# Patient Record
Sex: FEMALE | Race: OTHER | NOT HISPANIC OR LATINO | URBAN - METROPOLITAN AREA
[De-identification: names, ages, dates, MRNs, and addresses within clinical notes are randomized per-mention and may not be internally consistent; named-entity substitution may affect disease eponyms.]

---

## 2024-09-04 ENCOUNTER — OUTPATIENT (OUTPATIENT)
Dept: OUTPATIENT SERVICES | Facility: HOSPITAL | Age: 34
LOS: 1 days | End: 2024-09-04
Payer: MEDICAID

## 2024-09-04 VITALS
OXYGEN SATURATION: 98 % | RESPIRATION RATE: 16 BRPM | HEART RATE: 86 BPM | DIASTOLIC BLOOD PRESSURE: 70 MMHG | SYSTOLIC BLOOD PRESSURE: 109 MMHG | TEMPERATURE: 99 F

## 2024-09-04 DIAGNOSIS — O26.899 OTHER SPECIFIED PREGNANCY RELATED CONDITIONS, UNSPECIFIED TRIMESTER: ICD-10-CM

## 2024-09-04 PROCEDURE — 99212 OFFICE O/P EST SF 10 MIN: CPT | Mod: 25

## 2024-09-04 PROCEDURE — 59025 FETAL NON-STRESS TEST: CPT

## 2024-09-04 PROCEDURE — 76818 FETAL BIOPHYS PROFILE W/NST: CPT | Mod: 26

## 2024-09-04 PROCEDURE — 59025 FETAL NON-STRESS TEST: CPT | Mod: 26

## 2024-09-04 PROCEDURE — 76818 FETAL BIOPHYS PROFILE W/NST: CPT

## 2024-09-04 PROCEDURE — G0463: CPT

## 2024-09-04 NOTE — OB RN TRIAGE NOTE - INTERNATIONAL TRAVEL
Received call from Ramana: pt is out of her Lovenox and waiting for PA to go through on 0.4 ml (40 mg) sub-q injections. Pt currently has an active PA on prior 0.6 ml (60 mg) injections and willing to pay co-pay if we can send a few days worth Rx to pharmacy until her current PA is approved. Rx sent for #5 days (inject 0.35 mLs every 12 hours).  
No

## 2024-09-04 NOTE — OB PROVIDER TRIAGE NOTE - HISTORY OF PRESENT ILLNESS
34 yo  @36w5d  week presents to triage c/o contractions at 4am that has then subsided.  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

## 2024-09-04 NOTE — OB PROVIDER TRIAGE NOTE - ADDITIONAL INSTRUCTIONS
- d/c home  - labor precautions given  - patient to return to L+D if any vaginal bleeding, leakage of fluids or contraction pain  - encouraged patient to continue valtrex for prophylaxis  - f/u with Dr Hayes in office as scheduled

## 2024-09-04 NOTE — OB PROVIDER TRIAGE NOTE - NSOBPROVIDERNOTE_OBGYN_ALL_OB_FT
a/p siup @ 36weeks 5days not in labor, nst reactive bpp 8/8  - d/c home  - labor precautions given  - patient to return to L+D if any vaginal bleeding, leakage of fluids or contraction pain  - encouraged patient to continue valtrex for prophylaxis  - f/u with Dr Hayes in office as scheduled  nst reviewed by sarah Hernandez attending

## 2024-09-04 NOTE — OB PROVIDER TRIAGE NOTE - NSHPLABSRESULTS_GEN_ALL_CORE
< from: US Fetal Bio Profile w/Non-Stress (09.04.24 @ 14:12) >    GESTATIONAL AGE: 36weeks.    COMPARISON: None available.    TECHNIQUE:  Transabdominal pelvic sonogram only.    FINDINGS:    Single live Intrauterine gestation is present.  Fetal position: CEPH presentation.  Placenta location: FUND.  Amniotic fluid volume: ERNIE 17.8 cm.  Cervical length: Not visualized by transabdominal technique.  Fetal motion is seen in real-time and the fetal heart rate measures 150   bpm bpm.    Fetal anatomy and umbilical cord were not evaluated.    No fetal measurements were obtained    The biophysical profile is 8/8.    Body Movement: 2  (score 2) Greater or equal than 3 body/limb movements  (score 0) Less than 3 body/limb movements    Tone: 2  (score 2) One or more episodes extension/flexion  (score 0) No episodes extension/flexion    Breathing movements: 2  (score 2) Any breathing movements or hiccups  (score 0) No breathing movements    Amniotic fluid: 2  (score 2) One pocket >= 2cm in 2 perpendicular planes  (score 0) <2 X 2 cm pocket    Additional comments: None.    IMPRESSION:  Single live intrauterine pregnancy with a biophysical profile score of   8/8.    < end of copied text >

## 2024-09-04 NOTE — OB RN TRIAGE NOTE - FALL HARM RISK - UNIVERSAL INTERVENTIONS
Bed in lowest position, wheels locked, appropriate side rails in place/Call bell, personal items and telephone in reach/Instruct patient to call for assistance before getting out of bed or chair/Non-slip footwear when patient is out of bed/[place] to call system/Physically safe environment - no spills, clutter or unnecessary equipment/Purposeful Proactive Rounding/Room/bathroom lighting operational, light cord in reach

## 2024-09-04 NOTE — OB PROVIDER TRIAGE NOTE - NSHPPHYSICALEXAM_GEN_ALL_CORE
gen: nad, well appearing  Vital Signs Last 24 Hrs  T(C): 37 (04 Sep 2024 12:11), Max: 37 (04 Sep 2024 12:11)  T(F): 98.6 (04 Sep 2024 12:11), Max: 98.6 (04 Sep 2024 12:11)  HR: 86 (04 Sep 2024 12:11) (86 - 86)  BP: 109/70 (04 Sep 2024 12:11) (109/70 - 109/70)  BP(mean): --  RR: 16 (04 Sep 2024 12:11) (16 - 16)  SpO2: 98% (04 Sep 2024 12:11) (98% - 98%)    Parameters below as of 04 Sep 2024 12:11  Patient On (Oxygen Delivery Method): room air    abd: gravid, soft, non tender  gyn: left bartholin gland enlarged  sve closed and long

## 2024-09-06 DIAGNOSIS — Z3A.36 36 WEEKS GESTATION OF PREGNANCY: ICD-10-CM

## 2024-09-06 DIAGNOSIS — O99.013 ANEMIA COMPLICATING PREGNANCY, THIRD TRIMESTER: ICD-10-CM

## 2024-09-06 DIAGNOSIS — D64.9 ANEMIA, UNSPECIFIED: ICD-10-CM

## 2024-09-06 DIAGNOSIS — O47.03 FALSE LABOR BEFORE 37 COMPLETED WEEKS OF GESTATION, THIRD TRIMESTER: ICD-10-CM

## 2024-09-24 ENCOUNTER — OUTPATIENT (OUTPATIENT)
Dept: OUTPATIENT SERVICES | Facility: HOSPITAL | Age: 34
LOS: 1 days | End: 2024-09-24
Payer: MEDICAID

## 2024-09-24 VITALS
SYSTOLIC BLOOD PRESSURE: 111 MMHG | OXYGEN SATURATION: 97 % | RESPIRATION RATE: 17 BRPM | DIASTOLIC BLOOD PRESSURE: 75 MMHG | TEMPERATURE: 98 F | HEART RATE: 98 BPM

## 2024-09-24 DIAGNOSIS — O26.899 OTHER SPECIFIED PREGNANCY RELATED CONDITIONS, UNSPECIFIED TRIMESTER: ICD-10-CM

## 2024-09-24 PROCEDURE — 96360 HYDRATION IV INFUSION INIT: CPT

## 2024-09-24 PROCEDURE — 59025 FETAL NON-STRESS TEST: CPT | Mod: 26

## 2024-09-24 PROCEDURE — 76819 FETAL BIOPHYS PROFIL W/O NST: CPT

## 2024-09-24 PROCEDURE — 76819 FETAL BIOPHYS PROFIL W/O NST: CPT | Mod: 26

## 2024-09-24 PROCEDURE — 59025 FETAL NON-STRESS TEST: CPT

## 2024-09-24 PROCEDURE — G0463: CPT

## 2024-09-24 PROCEDURE — 99212 OFFICE O/P EST SF 10 MIN: CPT | Mod: 25

## 2024-09-24 RX ORDER — SODIUM CHLORIDE IRRIG SOLUTION 0.9 %
1000 SOLUTION, IRRIGATION IRRIGATION ONCE
Refills: 0 | Status: COMPLETED | OUTPATIENT
Start: 2024-09-24 | End: 2024-09-24

## 2024-09-24 RX ADMIN — Medication 1000 MILLILITER(S): at 14:15

## 2024-09-24 NOTE — OB PROVIDER TRIAGE NOTE - NSOBPROVIDERNOTE_OBGYN_ALL_OB_FT
pregnant at     - for prolong nst    - iv hydration     - bpp per dr huerta pregnant at     - for prolong nst    - iv hydration     - bpp per dr huerta    f/u note: bpp 8/8 ebonie: 15    NST reactive no decels    toco + irritabilities    dw dr blank- dc home today and see him In the office 9/26/24     labor precautions continue prenatal vitamins   if water breaks contractions noted vaginal bleeding noted return to delivery room  daily check the baby movements with fetal kick count

## 2024-09-24 NOTE — OB PROVIDER TRIAGE NOTE - HISTORY OF PRESENT ILLNESS
sent in by dr huerta for prolong nst due to noted non reactive fetal tracing in the office      sent in by dr huerta for prolong nst due to noted non reactive fetal tracing in the office     33yo  siup at 39 4/7weeeks sent in for above    denies s/sx of labor denies labor pains/ vag bleeding/ LOF    reports +FM     last seen by mfm  in [facility]

## 2024-09-24 NOTE — OB PROVIDER TRIAGE NOTE - YOU WERE IN THE HOSPITAL FOR:
f/u note: bpp 8/8 ebonie: 15    NST reactive no decels    toco + irritabilities    dw dr blank- jasmin home today and see him In the office 9/26/24     labor precautions continue prenatal vitamins   if water breaks contractions noted vaginal bleeding noted return to delivery room  daily check the baby movements with fetal kick count

## 2024-09-24 NOTE — OB PROVIDER TRIAGE NOTE - NSHPPHYSICALEXAM_GEN_ALL_CORE
moderate variabiltiy no decels +accels reactive     toco no ctxs    abd gravid soft NT no guarding no rebound     ve deferred at this time     extrem non pitting edema b/l

## 2024-09-24 NOTE — OB PROVIDER TRIAGE NOTE - NS_EDDCALCULATED_OBGYN_ALL_OB
Patient checked 15 minutes after receiving injection of Celestone. No reactions noted, no complaints voiced.  
LMP

## 2024-09-25 PROBLEM — Z98.890 OTHER SPECIFIED POSTPROCEDURAL STATES: Chronic | Status: ACTIVE | Noted: 2024-09-04

## 2024-09-25 PROBLEM — B00.9 HERPESVIRAL INFECTION, UNSPECIFIED: Chronic | Status: ACTIVE | Noted: 2024-09-04

## 2024-09-26 DIAGNOSIS — O36.8330 MATERNAL CARE FOR ABNORMALITIES OF THE FETAL HEART RATE OR RHYTHM, THIRD TRIMESTER, NOT APPLICABLE OR UNSPECIFIED: ICD-10-CM

## 2024-09-26 DIAGNOSIS — Z3A.39 39 WEEKS GESTATION OF PREGNANCY: ICD-10-CM

## 2024-09-26 DIAGNOSIS — O09.293 SUPERVISION OF PREGNANCY WITH OTHER POOR REPRODUCTIVE OR OBSTETRIC HISTORY, THIRD TRIMESTER: ICD-10-CM

## 2024-09-27 ENCOUNTER — INPATIENT (INPATIENT)
Facility: HOSPITAL | Age: 34
LOS: 2 days | Discharge: HOME | DRG: 951 | End: 2024-09-30
Attending: OBSTETRICS & GYNECOLOGY | Admitting: OBSTETRICS & GYNECOLOGY
Payer: MEDICAID

## 2024-09-27 ENCOUNTER — OUTPATIENT (OUTPATIENT)
Dept: OUTPATIENT SERVICES | Facility: HOSPITAL | Age: 34
LOS: 1 days | End: 2024-09-27

## 2024-09-27 VITALS
TEMPERATURE: 98 F | SYSTOLIC BLOOD PRESSURE: 120 MMHG | HEIGHT: 62 IN | DIASTOLIC BLOOD PRESSURE: 65 MMHG | WEIGHT: 167.99 LBS | RESPIRATION RATE: 16 BRPM | HEART RATE: 78 BPM | OXYGEN SATURATION: 99 %

## 2024-09-27 VITALS
OXYGEN SATURATION: 98 % | HEIGHT: 62 IN | SYSTOLIC BLOOD PRESSURE: 126 MMHG | HEART RATE: 73 BPM | TEMPERATURE: 98 F | RESPIRATION RATE: 15 BRPM | WEIGHT: 167.99 LBS | DIASTOLIC BLOOD PRESSURE: 65 MMHG

## 2024-09-27 DIAGNOSIS — O26.899 OTHER SPECIFIED PREGNANCY RELATED CONDITIONS, UNSPECIFIED TRIMESTER: ICD-10-CM

## 2024-09-27 DIAGNOSIS — Z34.90 ENCOUNTER FOR SUPERVISION OF NORMAL PREGNANCY, UNSPECIFIED, UNSPECIFIED TRIMESTER: ICD-10-CM

## 2024-09-27 LAB
ANISOCYTOSIS BLD QL: SLIGHT — SIGNIFICANT CHANGE UP
APTT BLD: 28.8 SEC — SIGNIFICANT CHANGE UP (ref 24.5–35.6)
BASO STIPL BLD QL SMEAR: PRESENT — SIGNIFICANT CHANGE UP
BASOPHILS # BLD AUTO: 0.01 K/UL — SIGNIFICANT CHANGE UP (ref 0–0.2)
BASOPHILS NFR BLD AUTO: 0.1 % — SIGNIFICANT CHANGE UP (ref 0–2)
BLD GP AB SCN SERPL QL: SIGNIFICANT CHANGE UP
DACRYOCYTES BLD QL SMEAR: SLIGHT — SIGNIFICANT CHANGE UP
EOSINOPHIL # BLD AUTO: 0.06 K/UL — SIGNIFICANT CHANGE UP (ref 0–0.5)
EOSINOPHIL NFR BLD AUTO: 0.8 % — SIGNIFICANT CHANGE UP (ref 0–6)
FIBRINOGEN PPP-MCNC: 480 MG/DL — HIGH (ref 200–475)
HCT VFR BLD CALC: 30 % — LOW (ref 34.5–45)
HGB BLD-MCNC: 9.5 G/DL — LOW (ref 11.5–15.5)
IMM GRANULOCYTES NFR BLD AUTO: 0.6 % — SIGNIFICANT CHANGE UP (ref 0–0.9)
INR BLD: 0.91 RATIO — SIGNIFICANT CHANGE UP (ref 0.85–1.16)
LG PLATELETS BLD QL AUTO: SLIGHT — SIGNIFICANT CHANGE UP
LYMPHOCYTES # BLD AUTO: 1.92 K/UL — SIGNIFICANT CHANGE UP (ref 1–3.3)
LYMPHOCYTES # BLD AUTO: 24.3 % — SIGNIFICANT CHANGE UP (ref 13–44)
MANUAL SMEAR VERIFICATION: SIGNIFICANT CHANGE UP
MCHC RBC-ENTMCNC: 22.5 PG — LOW (ref 27–34)
MCHC RBC-ENTMCNC: 31.7 GM/DL — LOW (ref 32–36)
MCV RBC AUTO: 70.9 FL — LOW (ref 80–100)
MICROCYTES BLD QL: SLIGHT — SIGNIFICANT CHANGE UP
MONOCYTES # BLD AUTO: 0.45 K/UL — SIGNIFICANT CHANGE UP (ref 0–0.9)
MONOCYTES NFR BLD AUTO: 5.7 % — SIGNIFICANT CHANGE UP (ref 2–14)
NEUTROPHILS # BLD AUTO: 5.4 K/UL — SIGNIFICANT CHANGE UP (ref 1.8–7.4)
NEUTROPHILS NFR BLD AUTO: 68.5 % — SIGNIFICANT CHANGE UP (ref 43–77)
NRBC # BLD: 0 /100 WBCS — SIGNIFICANT CHANGE UP (ref 0–0)
PLAT MORPH BLD: NORMAL — SIGNIFICANT CHANGE UP
PLATELET # BLD AUTO: 157 K/UL — SIGNIFICANT CHANGE UP (ref 150–400)
PLATELET COUNT - ESTIMATE: NORMAL — SIGNIFICANT CHANGE UP
POLYCHROMASIA BLD QL SMEAR: SLIGHT — SIGNIFICANT CHANGE UP
PROTHROM AB SERPL-ACNC: 10.6 SEC — SIGNIFICANT CHANGE UP (ref 9.9–13.4)
RBC # BLD: 4.23 M/UL — SIGNIFICANT CHANGE UP (ref 3.8–5.2)
RBC # FLD: 14.2 % — SIGNIFICANT CHANGE UP (ref 10.3–14.5)
RBC BLD AUTO: ABNORMAL
WBC # BLD: 7.89 K/UL — SIGNIFICANT CHANGE UP (ref 3.8–10.5)
WBC # FLD AUTO: 7.89 K/UL — SIGNIFICANT CHANGE UP (ref 3.8–10.5)

## 2024-09-27 RX ORDER — CHLORHEXIDINE GLUCONATE ORAL RINSE 1.2 MG/ML
1 SOLUTION DENTAL DAILY
Refills: 0 | Status: DISCONTINUED | OUTPATIENT
Start: 2024-09-27 | End: 2024-09-28

## 2024-09-27 RX ORDER — OXYTOCIN/RINGER'S LACTATE 20/500ML
167 PLASTIC BAG, INJECTION (ML) INTRAVENOUS
Qty: 30 | Refills: 0 | Status: DISCONTINUED | OUTPATIENT
Start: 2024-09-27 | End: 2024-09-28

## 2024-09-27 RX ORDER — SODIUM CHLORIDE IRRIG SOLUTION 0.9 %
1000 SOLUTION, IRRIGATION IRRIGATION
Refills: 0 | Status: DISCONTINUED | OUTPATIENT
Start: 2024-09-27 | End: 2024-09-28

## 2024-09-27 RX ORDER — INFLUENZA VIRUS VACCINE 15; 15; 15; 15 UG/.5ML; UG/.5ML; UG/.5ML; UG/.5ML
0.5 SUSPENSION INTRAMUSCULAR ONCE
Refills: 0 | Status: ACTIVE | OUTPATIENT
Start: 2024-09-27 | End: 2025-08-26

## 2024-09-27 RX ORDER — VALACYCLOVIR 1000 MG/1
1000 TABLET ORAL DAILY
Refills: 0 | Status: DISCONTINUED | OUTPATIENT
Start: 2024-09-27 | End: 2024-09-30

## 2024-09-27 RX ORDER — SODIUM CITRATE AND CITRIC ACID MONOHYDRATE 334; 500 MG/5ML; MG/5ML
15 SOLUTION ORAL EVERY 6 HOURS
Refills: 0 | Status: DISCONTINUED | OUTPATIENT
Start: 2024-09-27 | End: 2024-09-28

## 2024-09-27 RX ADMIN — Medication 125 MILLILITER(S): at 11:51

## 2024-09-27 RX ADMIN — VALACYCLOVIR 1000 MILLIGRAM(S): 1000 TABLET ORAL at 20:32

## 2024-09-27 RX ADMIN — Medication 125 MILLILITER(S): at 16:01

## 2024-09-27 NOTE — OB PROVIDER H&P - HISTORY OF PRESENT ILLNESS
OB PA H&P     34 year old  40wks RIZWAN 24 LMP 23 presents for scheduled elective induction at term. Notes irreg ctx, denies VB, LOF. Reports FM  Prenatal care: Dr. Hayes, HSV2 on Valtrex 1000 mg daily, denies recent outbreak, denies symptoms of HSV at this time  Obhx: TOP with pills   Gynhx: denies fibroids, ovarian cysts,   OB PA H&P     34 year old  40wks RIZWAN 24 LMP 23 presents for scheduled elective induction at term. Notes irreg ctx, denies VB, LOF. Reports FM  Prenatal care: Dr. 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 reports negative workup.  Medications: valtrex 1000 mg, pnv, vit d3  Surgical hx: denies  Social hx: denies anxiety/depression,  OB PA H&P     34 year old  40wks RIZWAN 24 LMP 23 presents for scheduled elective induction at term. Notes irreg ctx, denies VB, LOF. Reports FM  Prenatal care: Dr. 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 reports negative workup including CT.  Medications: valtrex 1000 mg, pnv, vit d3  Surgical hx: denies  Social hx: denies anxiety/depression, reports vaping nicotine few times a day, vaped yesterday, reports a few glasses of alcohol (6-7 glasses of wine) the whole pregnancy, denies drug usage  NKDA   OB PA H&P     34 year old  40wks RIZWAN 24 LMP 23 presents for scheduled elective induction at term. Notes irreg ctx, denies VB, LOF. Reports FM  Prenatal care: Dr. 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 reports negative workup including CT.  Medications: valtrex 1000 mg, pnv, vit d3  Surgical hx: denies  Social hx: denies anxiety/depression, reports vaping nicotine few times a day, vaped yesterday, reports a few glasses of alcohol (6-7 glasses of wine) the whole pregnancy, denies drug usage  NKDA   OB PA H&P     34 year old  40wks RIZWAN 24 LMP 23 presents for scheduled elective induction at term. Notes irreg ctx, denies VB, LOF. Reports FM  Prenatal care: Dr. 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 reports negative workup including CT.  Medications: valtrex 1000 mg, pnv, vit d3  Surgical hx: denies  Social hx: denies anxiety/depression, reports vaping nicotine few times a day, vaped yesterday, reports a few glasses of alcohol (6-7 glasses of wine) the whole pregnancy, denies drug usage  NKDA

## 2024-09-27 NOTE — OB PROVIDER LABOR PROGRESS NOTE - NS_SUBJECTIVE/OBJECTIVE_OBGYN_ALL_OB_FT
Patient evaluated at bedside   Resting comfortably   No new complaints     SVE: 1/50/-3/vertex/intact   cervical ripening balloon placed @ 2054  patient tolerated exam well

## 2024-09-27 NOTE — OB PROVIDER H&P - NSICDXPASTMEDICALHX_GEN_ALL_CORE_FT
PAST MEDICAL HISTORY:  HSV-2 infection     One previous induced termination of pregnancy     Visual snow syndrome

## 2024-09-27 NOTE — OB PROVIDER LABOR PROGRESS NOTE - ASSESSMENT
MIOL at term 40 weeks , stable  continue PO cytotec protocol  meal to meal  ambulate  venodynes while in bed  maternal/fetal monitoring  dw Dr. Abigail faust reviewed by Dr. Herminia smith attending

## 2024-09-27 NOTE — OB PROVIDER LABOR PROGRESS NOTE - NS_SUBJECTIVE/OBJECTIVE_OBGYN_ALL_OB_FT
Patient evaluated at bedside with Dr. Hayes   Patient resting comfortably   No complaints at this time   Discussed cervical ripening balloon, patient is agreeable to placement     SVE: 1/50/-3/vertex/intact   patient tolerated exam well

## 2024-09-27 NOTE — OB PROVIDER LABOR PROGRESS NOTE - ASSESSMENT
33 yo MIOL at term 40 weeks , stable    continue close maternal and fetal monitoring   continue PO cytotec protocol  pain management per patient request   place cervical ripening balloon   dw Dr. Abigail gaspart reviewed by Dr. Herminia pond

## 2024-09-27 NOTE — OB PROVIDER LABOR PROGRESS NOTE - NS_SUBJECTIVE/OBJECTIVE_OBGYN_ALL_OB_FT
s/p PO cytotec #2 20mcg  Patient offers no complaints at this time  nst cat I  toco irritability     continue current mngt  dw Dr. Hayes  nst reviewed by Dr. Jonathon smith attending

## 2024-09-27 NOTE — OB RN PATIENT PROFILE - MATERNAL MARITAL STATUS, OB PROFILE
Steroid Joint Injection   AMBULATORY CARE:   What you need to know about steroid joint injection:  A steroid joint injection is a procedure to inject steroid medicine into a joint  Steroid medicine decreases pain and inflammation  The injection may also contain an anesthetic (numbing medicine) to decrease pain  It may be done to treat conditions such as arthritis, gout, or carpal tunnel syndrome  The injections may be given in your knee, ankle, shoulder, elbow, or wrist  Injections may also be given in your hip, toe, thumb, or finger  How to prepare for steroid joint injection:  Your healthcare provider will talk to you about how to prepare for this procedure  He will tell you what medicines to take or not take on the day of your procedure  You may need to stop taking blood thinners several days before your procedure  What will happen during steroid joint injection:  You may be given local anesthesia to numb the area where the injection will be given  With local anesthesia, you may still feel pressure during the procedure, but you should not feel any pain  Your healthcare provider may use ultrasound or fluoroscopy (a type of x-ray) to guide the needle to the right area  He will then inject the steroid into your joint  A bandage will be placed on the injection site  What will happen after steroid joint injection:  You may have redness, warmth, or sweating in your face and chest right after the steroid injection  Steroids can affect blood sugar levels  If you have diabetes, you should check your blood sugars closely in the first 24 hours after your procedure  Risks of steroid joint injection:  You may get an infection in your joint  The injection may also cause more pain during the first 24 to 36 hours  You may need more than one injection to feel pain relief  The skin near the injection site may be damaged and become discolored or indented  This can happen if the steroid is placed too close to your skin   A tendon near the injection site may rupture or a nerve can be damaged  Contact your healthcare provider if:   · You have fever or chills  · You have redness or swelling in the injection site  · You have more pain than usual in your joint for more than 72 hours  · You have questions or concerns about your condition or care  Medicines:   · Pain medicine  may be given  Ask how to take this medicine safely  · Take your medicine as directed  Contact your healthcare provider if you think your medicine is not helping or if you have side effects  Tell him or her if you are allergic to any medicine  Keep a list of the medicines, vitamins, and herbs you take  Include the amounts, and when and why you take them  Bring the list or the pill bottles to follow-up visits  Carry your medicine list with you in case of an emergency  Self-care:   · Leave the bandage on for 8 to 12 hours  Care for your wound as directed  · Rest the area  as directed  You may need to decrease weight on certain joints, such as the knee, for a period of time  Ask when you can return to your daily activities  · Elevate  your limb where the steroid injection was given  Elevate the limb above the level of your heart as often as you can  This will help decrease swelling and pain  Prop your limb on pillows or blankets to keep it elevated comfortably  · Apply ice  on your joint for 15 to 20 minutes every hour or as directed  Use an ice pack, or put crushed ice in a plastic bag  Cover it with a towel  Ice helps prevent tissue damage and decreases swelling and pain  Follow up with your doctor as directed:  Write down your questions so you remember to ask them during your visits  © Copyright [employer] 2022 Information is for End User's use only and may not be sold, redistributed or otherwise used for commercial purposes   All illustrations and images included in CareNotes® are the copyrighted property of [employer]  or [address] [place] [address] [employer]   The above information is an  only  It is not intended as medical advice for individual conditions or treatments  Talk to your doctor, nurse or pharmacist before following any medical regimen to see if it is safe and effective for you  single

## 2024-09-27 NOTE — OB RN PATIENT PROFILE - POST PARTUM DEPRESSION SCREEN OB 2
no

## 2024-09-27 NOTE — OB PROVIDER LABOR PROGRESS NOTE - ASSESSMENT
35 yo MIOL at term 40 weeks , stable  cervical balloon in place   cytotec held due to frequent contractions    continue close maternal and fetal monitoring   continue PO cytotec protocol once contractions allow   pain management per patient request   dione faust reviewed by Dr. Herminia smith attending

## 2024-09-27 NOTE — OB RN PATIENT PROFILE - CAREGIVER ADDRESS
no fever/no burning urination/no vomiting/no hematuria/no chills/no blood in stool/no dysuria/no abdominal distension/no diarrhea
[facility] [id number]

## 2024-09-27 NOTE — OB PROVIDER H&P - PROBLEM SELECTOR PLAN 1
-Admit to labor and delivery  -Admission labs  -Valtrex ordered  -pain mngmt per patient request  -induction with PO cytotec per protocol  -venodynes  -continuos maternal/fetal monitoring  -informed consent to be obtained by physician  -D/w Dr. Hayes  -Dr. Holt, [place] attending, reviewed NST

## 2024-09-28 LAB
ABO RH CONFIRMATION: SIGNIFICANT CHANGE UP
ALBUMIN SERPL ELPH-MCNC: 2.6 G/DL — LOW (ref 3.5–5)
ALP SERPL-CCNC: 179 U/L — HIGH (ref 40–120)
ALT FLD-CCNC: 26 U/L DA — SIGNIFICANT CHANGE UP (ref 10–60)
ANION GAP SERPL CALC-SCNC: 7 MMOL/L — SIGNIFICANT CHANGE UP (ref 5–17)
APPEARANCE UR: CLEAR — SIGNIFICANT CHANGE UP
AST SERPL-CCNC: 23 U/L — SIGNIFICANT CHANGE UP (ref 10–40)
BACTERIA # UR AUTO: ABNORMAL /HPF
BASOPHILS # BLD AUTO: 0.02 K/UL — SIGNIFICANT CHANGE UP (ref 0–0.2)
BASOPHILS NFR BLD AUTO: 0.2 % — SIGNIFICANT CHANGE UP (ref 0–2)
BILIRUB SERPL-MCNC: 0.5 MG/DL — SIGNIFICANT CHANGE UP (ref 0.2–1.2)
BILIRUB UR-MCNC: NEGATIVE — SIGNIFICANT CHANGE UP
BUN SERPL-MCNC: 10 MG/DL — SIGNIFICANT CHANGE UP (ref 7–18)
CALCIUM SERPL-MCNC: 9.2 MG/DL — SIGNIFICANT CHANGE UP (ref 8.4–10.5)
CHLORIDE SERPL-SCNC: 109 MMOL/L — HIGH (ref 96–108)
CO2 SERPL-SCNC: 22 MMOL/L — SIGNIFICANT CHANGE UP (ref 22–31)
COLOR SPEC: YELLOW — SIGNIFICANT CHANGE UP
CREAT ?TM UR-MCNC: 46 MG/DL — SIGNIFICANT CHANGE UP
CREAT SERPL-MCNC: 0.46 MG/DL — LOW (ref 0.5–1.3)
DIFF PNL FLD: ABNORMAL
EGFR: 130 ML/MIN/1.73M2 — SIGNIFICANT CHANGE UP
EOSINOPHIL # BLD AUTO: 0.04 K/UL — SIGNIFICANT CHANGE UP (ref 0–0.5)
EOSINOPHIL NFR BLD AUTO: 0.3 % — SIGNIFICANT CHANGE UP (ref 0–6)
EPI CELLS # UR: PRESENT
GLUCOSE SERPL-MCNC: 85 MG/DL — SIGNIFICANT CHANGE UP (ref 70–99)
GLUCOSE UR QL: NEGATIVE MG/DL — SIGNIFICANT CHANGE UP
HCT VFR BLD CALC: 30.1 % — LOW (ref 34.5–45)
HGB BLD-MCNC: 9.9 G/DL — LOW (ref 11.5–15.5)
IMM GRANULOCYTES NFR BLD AUTO: 0.5 % — SIGNIFICANT CHANGE UP (ref 0–0.9)
KETONES UR-MCNC: 40 MG/DL
LDH SERPL L TO P-CCNC: 170 U/L — SIGNIFICANT CHANGE UP (ref 120–225)
LEUKOCYTE ESTERASE UR-ACNC: ABNORMAL
LYMPHOCYTES # BLD AUTO: 1.55 K/UL — SIGNIFICANT CHANGE UP (ref 1–3.3)
LYMPHOCYTES # BLD AUTO: 12.7 % — LOW (ref 13–44)
MCHC RBC-ENTMCNC: 23 PG — LOW (ref 27–34)
MCHC RBC-ENTMCNC: 32.9 GM/DL — SIGNIFICANT CHANGE UP (ref 32–36)
MCV RBC AUTO: 70 FL — LOW (ref 80–100)
MONOCYTES # BLD AUTO: 0.53 K/UL — SIGNIFICANT CHANGE UP (ref 0–0.9)
MONOCYTES NFR BLD AUTO: 4.4 % — SIGNIFICANT CHANGE UP (ref 2–14)
NEUTROPHILS # BLD AUTO: 9.97 K/UL — HIGH (ref 1.8–7.4)
NEUTROPHILS NFR BLD AUTO: 81.9 % — HIGH (ref 43–77)
NITRITE UR-MCNC: NEGATIVE — SIGNIFICANT CHANGE UP
NRBC # BLD: 0 /100 WBCS — SIGNIFICANT CHANGE UP (ref 0–0)
PH UR: 6 — SIGNIFICANT CHANGE UP (ref 5–8)
PLATELET # BLD AUTO: 151 K/UL — SIGNIFICANT CHANGE UP (ref 150–400)
POTASSIUM SERPL-MCNC: 3.8 MMOL/L — SIGNIFICANT CHANGE UP (ref 3.5–5.3)
POTASSIUM SERPL-SCNC: 3.8 MMOL/L — SIGNIFICANT CHANGE UP (ref 3.5–5.3)
PROT ?TM UR-MCNC: 27 MG/DL — HIGH (ref 0–12)
PROT SERPL-MCNC: 6.4 G/DL — SIGNIFICANT CHANGE UP (ref 6–8.3)
PROT UR-MCNC: NEGATIVE MG/DL — SIGNIFICANT CHANGE UP
PROT/CREAT UR-RTO: 0.6 RATIO — HIGH (ref 0–0.2)
RBC # BLD: 4.3 M/UL — SIGNIFICANT CHANGE UP (ref 3.8–5.2)
RBC # FLD: 14.3 % — SIGNIFICANT CHANGE UP (ref 10.3–14.5)
RBC CASTS # UR COMP ASSIST: ABNORMAL /HPF
RUBV IGG SER-ACNC: 1.21 INDEX — SIGNIFICANT CHANGE UP
RUBV IGG SER-IMP: POSITIVE — SIGNIFICANT CHANGE UP
SODIUM SERPL-SCNC: 138 MMOL/L — SIGNIFICANT CHANGE UP (ref 135–145)
SP GR SPEC: 1.01 — SIGNIFICANT CHANGE UP (ref 1–1.03)
T PALLIDUM AB TITR SER: NEGATIVE — SIGNIFICANT CHANGE UP
URATE SERPL-MCNC: 4.6 MG/DL — SIGNIFICANT CHANGE UP (ref 2.5–7)
UROBILINOGEN FLD QL: 1 MG/DL — SIGNIFICANT CHANGE UP (ref 0.2–1)
WBC # BLD: 12.17 K/UL — HIGH (ref 3.8–10.5)
WBC # FLD AUTO: 12.17 K/UL — HIGH (ref 3.8–10.5)
WBC UR QL: 3 /HPF — SIGNIFICANT CHANGE UP (ref 0–5)

## 2024-09-28 RX ORDER — KETOROLAC TROMETHAMINE 10 MG/1
30 TABLET, FILM COATED ORAL ONCE
Refills: 0 | Status: DISCONTINUED | OUTPATIENT
Start: 2024-09-28 | End: 2024-09-30

## 2024-09-28 RX ORDER — OXYTOCIN/RINGER'S LACTATE 20/500ML
PLASTIC BAG, INJECTION (ML) INTRAVENOUS
Qty: 30 | Refills: 0 | Status: DISCONTINUED | OUTPATIENT
Start: 2024-09-28 | End: 2024-09-28

## 2024-09-28 RX ORDER — MAGNESIUM HYDROXIDE 400 MG/5ML
30 SUSPENSION, ORAL (FINAL DOSE FORM) ORAL
Refills: 0 | Status: DISCONTINUED | OUTPATIENT
Start: 2024-09-28 | End: 2024-09-30

## 2024-09-28 RX ORDER — ANTI-ITCH CREAM 1 G/100G
1 OINTMENT TOPICAL EVERY 6 HOURS
Refills: 0 | Status: DISCONTINUED | OUTPATIENT
Start: 2024-09-28 | End: 2024-09-30

## 2024-09-28 RX ORDER — SODIUM CHLORIDE 0.9 % (FLUSH) 0.9 %
3 SYRINGE (ML) INJECTION EVERY 8 HOURS
Refills: 0 | Status: DISCONTINUED | OUTPATIENT
Start: 2024-09-28 | End: 2024-09-30

## 2024-09-28 RX ORDER — OXYTOCIN/RINGER'S LACTATE 20/500ML
167 PLASTIC BAG, INJECTION (ML) INTRAVENOUS
Qty: 30 | Refills: 0 | Status: DISCONTINUED | OUTPATIENT
Start: 2024-09-28 | End: 2024-09-30

## 2024-09-28 RX ORDER — ACETAMINOPHEN 325 MG
975 TABLET ORAL EVERY 6 HOURS
Refills: 0 | Status: DISCONTINUED | OUTPATIENT
Start: 2024-09-28 | End: 2024-09-30

## 2024-09-28 RX ORDER — TETANUS TOXOID, REDUCED DIPHTHERIA TOXOID AND ACELLULAR PERTUSSIS VACCINE, ADSORBED 5; 2.5; 8; 8; 2.5 [IU]/.5ML; [IU]/.5ML; UG/.5ML; UG/.5ML; UG/.5ML
0.5 SUSPENSION INTRAMUSCULAR ONCE
Refills: 0 | Status: DISCONTINUED | OUTPATIENT
Start: 2024-09-28 | End: 2024-09-30

## 2024-09-28 RX ORDER — SODIUM CHLORIDE 0.9 % (FLUSH) 0.9 %
1000 SYRINGE (ML) INJECTION
Refills: 0 | Status: DISCONTINUED | OUTPATIENT
Start: 2024-09-28 | End: 2024-09-30

## 2024-09-28 RX ORDER — SOAP/LANOLIN
1 BAR TOPICAL EVERY 4 HOURS
Refills: 0 | Status: DISCONTINUED | OUTPATIENT
Start: 2024-09-28 | End: 2024-09-30

## 2024-09-28 RX ORDER — DIBUCAINE 1 %
1 OINTMENT (GRAM) TOPICAL EVERY 6 HOURS
Refills: 0 | Status: DISCONTINUED | OUTPATIENT
Start: 2024-09-28 | End: 2024-09-30

## 2024-09-28 RX ORDER — DIPHENHYDRAMINE HCL 12.5MG/5ML
25 LIQUID (ML) ORAL EVERY 6 HOURS
Refills: 0 | Status: DISCONTINUED | OUTPATIENT
Start: 2024-09-28 | End: 2024-09-30

## 2024-09-28 RX ORDER — PRENATAL VIT,CAL 76/IRON/FOLIC 29 MG-1 MG
1 TABLET ORAL DAILY
Refills: 0 | Status: DISCONTINUED | OUTPATIENT
Start: 2024-09-28 | End: 2024-09-30

## 2024-09-28 RX ORDER — PRAMOXINE HYDROCHLORIDE 10 MG/ML
1 LOTION TOPICAL EVERY 4 HOURS
Refills: 0 | Status: DISCONTINUED | OUTPATIENT
Start: 2024-09-28 | End: 2024-09-30

## 2024-09-28 RX ADMIN — Medication 975 MILLIGRAM(S): at 22:35

## 2024-09-28 RX ADMIN — Medication 975 MILLIGRAM(S): at 16:50

## 2024-09-28 RX ADMIN — Medication 3 MILLILITER(S): at 16:42

## 2024-09-28 RX ADMIN — Medication 2 MILLIUNIT(S)/MIN: at 05:13

## 2024-09-28 RX ADMIN — Medication 3 MILLILITER(S): at 21:45

## 2024-09-28 RX ADMIN — VALACYCLOVIR 1000 MILLIGRAM(S): 1000 TABLET ORAL at 20:47

## 2024-09-28 RX ADMIN — Medication 975 MILLIGRAM(S): at 16:07

## 2024-09-28 RX ADMIN — Medication 1 TABLET(S): at 16:50

## 2024-09-28 RX ADMIN — Medication 6 MILLIUNIT(S)/MIN: at 05:35

## 2024-09-28 RX ADMIN — Medication 6 MILLIUNIT(S)/MIN: at 09:05

## 2024-09-28 RX ADMIN — Medication 975 MILLIGRAM(S): at 21:38

## 2024-09-28 RX ADMIN — Medication 125 MILLILITER(S): at 08:00

## 2024-09-28 NOTE — OB PROVIDER LABOR PROGRESS NOTE - ASSESSMENT
Pitocin stopped  IV bolus started  FSE placed for FHR contact during position changes  FHR resolved to baseline    Will allow for  resuscitation and then consider restarting pitocin  Dr Cueva aware

## 2024-09-28 NOTE — OB PROVIDER DELIVERY SUMMARY - NSPROVIDERDELIVERYNOTE_OBGYN_ALL_OB_FT
OF A VIABLE BABY GIRL AT 09:02 FROM VTX PRESENTATION EMERSON POSITION,APGARS 9/9.PLACENTA AT 09:05 BY CCT.ONE SMALL PERIURETHRAL TEAR REPAIRED WITH ONE 2-0 CHROMIC STITCH.CERVIX:INTACT.UTERUS:FIRM.EBL:150CC

## 2024-09-28 NOTE — OB RN DELIVERY SUMMARY - NS_ADMITROM_OBGYN_ALL_OB
Lab Results   Component Value Date    HGBA1C 5 8 (H) 12/30/2022       Recent Labs     04/29/23  0816 04/29/23  1356 04/29/23  1950 04/30/23  0617   POCGLU 240* 149* 170* 211*       Blood Sugar Average: Last 72 hrs:  (P) 182 [id number]     · Continue tube feeds  · Sliding scale insulin No

## 2024-09-28 NOTE — OB PROVIDER LABOR PROGRESS NOTE - NS_OBIHIFHRDETAILS_OBGYN_ALL_OB_FT
140, mod lexie, accels present
150, mod lexie, accels present
FHT: baseline: 135, moderate variability, + accels, no decels
FHT: baseline: 135, moderate variability, + accels, no decels

## 2024-09-28 NOTE — OB PROVIDER LABOR PROGRESS NOTE - NS_SUBJECTIVE/OBJECTIVE_OBGYN_ALL_OB_FT
Pt comfortable s/p epidural replacement Pt comfortable s/p epidural replacement  ICU Vital Signs Last 24 Hrs  T(C): 36.9 (27 Sep 2024 14:44), Max: 36.9 (27 Sep 2024 14:44)  T(F): 98.4 (27 Sep 2024 14:44), Max: 98.4 (27 Sep 2024 14:44)  HR: 73 (27 Sep 2024 14:44) (73 - 78)  BP: 126/65 (27 Sep 2024 14:44) (120/65 - 126/65)  BP(mean): --  ABP: --  ABP(mean): --  RR: 15 (27 Sep 2024 14:44) (15 - 16)  SpO2: 98% (27 Sep 2024 14:44) (98% - 99%)    O2 Parameters below as of 27 Sep 2024 14:44  Patient On (Oxygen Delivery Method): room air

## 2024-09-28 NOTE — CHART NOTE - NSCHARTNOTEFT_GEN_A_CORE
ATTENDING NOTE    PT EXAMINED.BALOON WAS REMOVED.  CX:4/80%/VTX/-3  AROM CLEAR FLUID IUPC PLACED.  GOOD BXB VARIABILITY  CATEGORY I TRACING  UTERINE CONTRACTIONS Q 5 MIN  A/P IUP AT 40 WEEKS S/P CYTOTEC INDUCTION  CONTINUE MATERNAL/FETAL SURVEILLANCE
cx:6/90%/vtx/-1  fhr:130 reactive  ucx's q 2-3 min   pitocin 2mu/min  A/P IUP at 40weeks in labor;continue maternal/fetal surveillance

## 2024-09-28 NOTE — OB PROVIDER DELIVERY SUMMARY - NSSELHIDDEN_OBGYN_ALL_OB_FT
Patient contacted and informed about all lab results, an also advised on maintaining healthy lifestyle, medication compliance, and regular checkup.  Patient re-iterates detailed understanding.    Lindy Staley MD   [NS_DeliveryAttending1_OBGYN_ALL_OB_FT:[id number]==]

## 2024-09-28 NOTE — OB PROVIDER LABOR PROGRESS NOTE - ASSESSMENT
35 yo  at 40.1 undergoing elective MIOL  -cervical balloon in place since 190  -cytotec held due to frequent contractions    continue close maternal and fetal monitoring   continue PO cytotec protocol once contractions allow   d/w Dr. Hayes   33 yo  at 40.1 undergoing elective MIOL  -cervical balloon in place since   -cytotec held due to frequent contractions    continue close maternal and fetal monitoring   continue PO cytotec protocol once contractions allow   PEC labs drawn due to some elevated bps  d/w Dr. Hayes

## 2024-09-29 LAB
ANION GAP SERPL CALC-SCNC: 6 MMOL/L — SIGNIFICANT CHANGE UP (ref 5–17)
APPEARANCE UR: CLEAR — SIGNIFICANT CHANGE UP
BACTERIA # UR AUTO: ABNORMAL /HPF
BASOPHILS # BLD AUTO: 0.02 K/UL — SIGNIFICANT CHANGE UP (ref 0–0.2)
BASOPHILS NFR BLD AUTO: 0.2 % — SIGNIFICANT CHANGE UP (ref 0–2)
BILIRUB UR-MCNC: NEGATIVE — SIGNIFICANT CHANGE UP
BUN SERPL-MCNC: 7 MG/DL — SIGNIFICANT CHANGE UP (ref 7–18)
CALCIUM SERPL-MCNC: 9.6 MG/DL — SIGNIFICANT CHANGE UP (ref 8.4–10.5)
CHLORIDE SERPL-SCNC: 110 MMOL/L — HIGH (ref 96–108)
CO2 SERPL-SCNC: 24 MMOL/L — SIGNIFICANT CHANGE UP (ref 22–31)
COLOR SPEC: YELLOW — SIGNIFICANT CHANGE UP
CREAT SERPL-MCNC: 0.4 MG/DL — LOW (ref 0.5–1.3)
DIFF PNL FLD: ABNORMAL
EGFR: 135 ML/MIN/1.73M2 — SIGNIFICANT CHANGE UP
EOSINOPHIL # BLD AUTO: 0.12 K/UL — SIGNIFICANT CHANGE UP (ref 0–0.5)
EOSINOPHIL NFR BLD AUTO: 0.9 % — SIGNIFICANT CHANGE UP (ref 0–6)
EPI CELLS # UR: PRESENT
GLUCOSE SERPL-MCNC: 57 MG/DL — LOW (ref 70–99)
GLUCOSE UR QL: NEGATIVE MG/DL — SIGNIFICANT CHANGE UP
HCT VFR BLD CALC: 26.2 % — LOW (ref 34.5–45)
HGB BLD-MCNC: 8.4 G/DL — LOW (ref 11.5–15.5)
HYALINE CASTS # UR AUTO: PRESENT
IMM GRANULOCYTES NFR BLD AUTO: 0.6 % — SIGNIFICANT CHANGE UP (ref 0–0.9)
KETONES UR-MCNC: NEGATIVE MG/DL — SIGNIFICANT CHANGE UP
LEUKOCYTE ESTERASE UR-ACNC: ABNORMAL
LYMPHOCYTES # BLD AUTO: 16.5 % — SIGNIFICANT CHANGE UP (ref 13–44)
LYMPHOCYTES # BLD AUTO: 2.13 K/UL — SIGNIFICANT CHANGE UP (ref 1–3.3)
MCHC RBC-ENTMCNC: 22.9 PG — LOW (ref 27–34)
MCHC RBC-ENTMCNC: 32.1 GM/DL — SIGNIFICANT CHANGE UP (ref 32–36)
MCV RBC AUTO: 71.4 FL — LOW (ref 80–100)
MONOCYTES # BLD AUTO: 0.64 K/UL — SIGNIFICANT CHANGE UP (ref 0–0.9)
MONOCYTES NFR BLD AUTO: 5 % — SIGNIFICANT CHANGE UP (ref 2–14)
NEUTROPHILS # BLD AUTO: 9.91 K/UL — HIGH (ref 1.8–7.4)
NEUTROPHILS NFR BLD AUTO: 76.8 % — SIGNIFICANT CHANGE UP (ref 43–77)
NITRITE UR-MCNC: NEGATIVE — SIGNIFICANT CHANGE UP
NRBC # BLD: 0 /100 WBCS — SIGNIFICANT CHANGE UP (ref 0–0)
PH UR: 6.5 — SIGNIFICANT CHANGE UP (ref 5–8)
PLATELET # BLD AUTO: 145 K/UL — LOW (ref 150–400)
POTASSIUM SERPL-MCNC: 3.8 MMOL/L — SIGNIFICANT CHANGE UP (ref 3.5–5.3)
POTASSIUM SERPL-SCNC: 3.8 MMOL/L — SIGNIFICANT CHANGE UP (ref 3.5–5.3)
PROT UR-MCNC: NEGATIVE MG/DL — SIGNIFICANT CHANGE UP
RBC # BLD: 3.67 M/UL — LOW (ref 3.8–5.2)
RBC # FLD: 14.3 % — SIGNIFICANT CHANGE UP (ref 10.3–14.5)
RBC CASTS # UR COMP ASSIST: 3 /HPF — SIGNIFICANT CHANGE UP (ref 0–4)
SODIUM SERPL-SCNC: 140 MMOL/L — SIGNIFICANT CHANGE UP (ref 135–145)
SP GR SPEC: 1 — SIGNIFICANT CHANGE UP (ref 1–1.03)
UROBILINOGEN FLD QL: 1 MG/DL — SIGNIFICANT CHANGE UP (ref 0.2–1)
WBC # BLD: 12.9 K/UL — HIGH (ref 3.8–10.5)
WBC # FLD AUTO: 12.9 K/UL — HIGH (ref 3.8–10.5)
WBC UR QL: 2 /HPF — SIGNIFICANT CHANGE UP (ref 0–5)

## 2024-09-29 RX ORDER — PRENATAL VIT,CAL 76/IRON/FOLIC 29 MG-1 MG
1 TABLET ORAL
Qty: 30 | Refills: 0
Start: 2024-09-29 | End: 2024-10-28

## 2024-09-29 RX ORDER — ACETAMINOPHEN 325 MG
2 TABLET ORAL
Qty: 112 | Refills: 0
Start: 2024-09-29 | End: 2024-10-12

## 2024-09-29 RX ADMIN — Medication 3 MILLILITER(S): at 05:37

## 2024-09-29 RX ADMIN — VALACYCLOVIR 1000 MILLIGRAM(S): 1000 TABLET ORAL at 13:25

## 2024-09-29 RX ADMIN — Medication 975 MILLIGRAM(S): at 14:30

## 2024-09-29 RX ADMIN — Medication 975 MILLIGRAM(S): at 21:10

## 2024-09-29 RX ADMIN — Medication 975 MILLIGRAM(S): at 13:28

## 2024-09-29 RX ADMIN — Medication 1 TABLET(S): at 12:55

## 2024-09-29 RX ADMIN — Medication 975 MILLIGRAM(S): at 22:10

## 2024-09-29 RX ADMIN — Medication 3 MILLILITER(S): at 22:43

## 2024-09-29 NOTE — DISCHARGE NOTE OB - CARE PLAN
1 Principal Discharge DX:	 (normal spontaneous vaginal delivery)  Assessment and plan of treatment:	No sex, nothing in vagina, no heavy lifting, no pushing, no straining, no strenuous activities  Pain medication as needed; stool softener; dulcolax as needed if constipated  Walk for exercise: helps recovery   Continue prenatal vitamins daily especially whole course of breastfeeding  See your OB in the office for follow up post partum check 4-5 weeks, call the office to set up an appointment   Principal Discharge DX:	 (normal spontaneous vaginal delivery)  Assessment and plan of treatment:	No sex, nothing in vagina, no heavy lifting, no pushing, no straining, no strenuous activities  Pain medication as needed; stool softener; dulcolax as needed if constipated  Walk for exercise: helps recovery   Continue prenatal vitamins daily especially whole course of breastfeeding  See your OB in the office for follow up post partum check 4-5 weeks, call the office to set up an appointment  Secondary Diagnosis:	HSV-2 (herpes simplex virus 2) infection  Assessment and plan of treatment:	s/p delivery prophylaxis

## 2024-09-29 NOTE — DISCHARGE NOTE OB - PLAN OF CARE
No sex, nothing in vagina, no heavy lifting, no pushing, no straining, no strenuous activities  Pain medication as needed; stool softener; dulcolax as needed if constipated  Walk for exercise: helps recovery   Continue prenatal vitamins daily especially whole course of breastfeeding  See your OB in the office for follow up post partum check 4-5 weeks, call the office to set up an appointment s/p delivery prophylaxis

## 2024-09-29 NOTE — DISCHARGE NOTE OB - CARE PROVIDER_API CALL
Bassem Hayes  Obstetrics and Gynecology  [facility] [address] [place] [address], [place] [address]  Phone: [phone]  Fax: [phone]  Established Patient  Follow Up Time: 1 month

## 2024-09-29 NOTE — DISCHARGE NOTE OB - MEDICATION SUMMARY - MEDICATIONS TO TAKE
I will START or STAY ON the medications listed below when I get home from the hospital:    ibuprofen 600 mg oral tablet  -- 1 tab(s) by mouth every 6 hours as needed for  moderate pain  -- Indication: For moderate pain    Tylenol Extra Strength 500 mg oral tablet  -- 2 tab(s) by mouth every 6 hours  -- Indication: For mild pain     Prenatal Plus oral tablet  -- 1 tab(s) by mouth once a day  -- Indication: For breastfeeding    Colace 2-in-1 50 mg-8.6 mg oral tablet  -- 2 tab(s) by mouth once a day (at bedtime) as needed for  constipation  -- Indication: For constipation    simethicone 80 mg oral tablet, chewable  -- 1 tab(s) chewed every 6 hours as needed for  heartburn  -- Indication: For gas pain

## 2024-09-29 NOTE — DISCHARGE NOTE OB - PATIENT PORTAL LINK FT
You can access the FollowMyHealth Patient Portal offered by [facility] by registering at the following website: [email or web address]. By joining [facility]’s FollowMyHealth portal, you will also be able to view your health information using other applications (apps) compatible with our system.

## 2024-09-29 NOTE — DISCHARGE NOTE OB - MATERIALS PROVIDED
Vaccinations/[facility]  Screening Program/  Immunization Record/Breastfeeding Log/Breastfeeding Mother’s Support Group Information/Guide to Postpartum Care/[facility] Hearing Screen Program/Back To Sleep Handout/Shaken Baby Prevention Handout/Breastfeeding Guide and Packet/Birth Certificate Instructions/Discharge Medication Information for Patients and Families Pocket Guide/Letter of Medical Neccessity/Prescription for Breast Pump

## 2024-09-29 NOTE — DISCHARGE NOTE OB - BREAST MILK IS MORE DIGESTIBLE, MAKING VOMITING, DIARRHEA, GAS AND CONSTIPATION LESS COMMON
Medication not on protocol, last refilled 4-8-19.  Patient last seen 1-21-19, pending appointment none ; please advise.    
Statement Selected

## 2024-09-30 VITALS
SYSTOLIC BLOOD PRESSURE: 122 MMHG | DIASTOLIC BLOOD PRESSURE: 68 MMHG | TEMPERATURE: 98 F | HEART RATE: 66 BPM | RESPIRATION RATE: 16 BRPM | OXYGEN SATURATION: 99 %

## 2024-09-30 DIAGNOSIS — D62 ACUTE POSTHEMORRHAGIC ANEMIA: ICD-10-CM

## 2024-09-30 PROCEDURE — 84156 ASSAY OF PROTEIN URINE: CPT

## 2024-09-30 PROCEDURE — 86780 TREPONEMA PALLIDUM: CPT

## 2024-09-30 PROCEDURE — 84550 ASSAY OF BLOOD/URIC ACID: CPT

## 2024-09-30 PROCEDURE — 85025 COMPLETE CBC W/AUTO DIFF WBC: CPT

## 2024-09-30 PROCEDURE — 80048 BASIC METABOLIC PNL TOTAL CA: CPT

## 2024-09-30 PROCEDURE — 86762 RUBELLA ANTIBODY: CPT

## 2024-09-30 PROCEDURE — 59050 FETAL MONITOR W/REPORT: CPT

## 2024-09-30 PROCEDURE — 85384 FIBRINOGEN ACTIVITY: CPT

## 2024-09-30 PROCEDURE — 86901 BLOOD TYPING SEROLOGIC RH(D): CPT

## 2024-09-30 PROCEDURE — 36415 COLL VENOUS BLD VENIPUNCTURE: CPT

## 2024-09-30 PROCEDURE — 86923 COMPATIBILITY TEST ELECTRIC: CPT

## 2024-09-30 PROCEDURE — 82570 ASSAY OF URINE CREATININE: CPT

## 2024-09-30 PROCEDURE — 85610 PROTHROMBIN TIME: CPT

## 2024-09-30 PROCEDURE — C1726: CPT

## 2024-09-30 PROCEDURE — 86900 BLOOD TYPING SEROLOGIC ABO: CPT

## 2024-09-30 PROCEDURE — 86850 RBC ANTIBODY SCREEN: CPT

## 2024-09-30 PROCEDURE — 85730 THROMBOPLASTIN TIME PARTIAL: CPT

## 2024-09-30 PROCEDURE — 83615 LACTATE (LD) (LDH) ENZYME: CPT

## 2024-09-30 PROCEDURE — 80053 COMPREHEN METABOLIC PANEL: CPT

## 2024-09-30 PROCEDURE — 81001 URINALYSIS AUTO W/SCOPE: CPT

## 2024-09-30 RX ADMIN — Medication 1 SPRAY(S): at 08:57

## 2024-09-30 RX ADMIN — Medication 975 MILLIGRAM(S): at 08:57

## 2024-09-30 RX ADMIN — PRAMOXINE HYDROCHLORIDE 1 APPLICATION(S): 10 LOTION TOPICAL at 08:57

## 2024-09-30 RX ADMIN — Medication 975 MILLIGRAM(S): at 09:57

## 2024-09-30 RX ADMIN — ANTI-ITCH CREAM 1 APPLICATION(S): 1 OINTMENT TOPICAL at 08:58

## 2024-09-30 RX ADMIN — Medication 3 MILLILITER(S): at 05:35

## 2024-09-30 RX ADMIN — Medication 1 APPLICATION(S): at 09:00

## 2024-09-30 NOTE — PROGRESS NOTE ADULT - ASSESSMENT
A/P:  35 yo PPD#1 s/p  @ 40w1d , pt stable     -Continue pain management  -DVT ppx: Encourage OOB and ambulation  -Labs reviewed   -Vitals reviewed   -Continue current care  -Plan for discharge tomorrow    -d/w Dr. Hayes   
A/P:  PPD#2 s/p  with acute on chronic anemia asymptomatic    - Discharge home with instructions  -Follow up in office in 5-6 weeks for postpartum visit  -Breastfeeding encouraged   -d/w dr. laine brooks

## 2024-09-30 NOTE — LACTATION INITIAL EVALUATION - LACTATION INTERVENTIONS
Mother taught benefits of breastfeeding, positioning and latch techniques, signs of good latch, milk transfer, feeding cues, cluster feedings and signs of satiety.  Reinforced importance of unlimited feeding on Cue at least 8-12X per 24 hours and of diaper count to assess for adequate intake. Attended mother-baby breastfeeding/discharge class today; pt's questions/concerns regarding breastfeeding and general self and 's care addressed. Referral for breastpump sent via pt's insurance per pt's request. Referral sent for tele-lactation program for breastfeeding f/u assessment and support post dc./initiate/review safe skin-to-skin/initiate/review hand expression/initiate/review pumping guidelines and safe milk handling/initiate/review techniques for position and latch/post discharge community resources provided/review techniques to increase milk supply/review techniques to manage sore nipples/engorgement/initiate/review breast massage/compression/reviewed components of an effective feeding and at least 8 effective feedings per day required/reviewed importance of monitoring infant diapers, the breastfeeding log, and minimum output each day/reviewed benefits and recommendations for rooming in/reviewed feeding on demand/by cue at least 8 times a day/recommended follow-up with pediatrician within 24 hours of discharge/reviewed indications of inadequate milk transfer that would require supplementation

## 2024-09-30 NOTE — PROGRESS NOTE ADULT - PROBLEM SELECTOR PLAN 2
A/P:  PPD#2 s/p  with acute on chronic anemia asymptomatic    - Discharge home with instructions  -Follow up in office in 5-6 weeks for postpartum visit  -Breastfeeding encouraged   -d/w dr. laine brooks

## 2024-09-30 NOTE — PROGRESS NOTE ADULT - SUBJECTIVE AND OBJECTIVE BOX
Patient seen at bedside resting comfortably offers no current complaints. Ambulating and voiding without difficulty.  Passing flatus and tolerating regular diet. Pt both breast/bottle feeding. Pt denies headache, chest pain, weakness, dizziness, N/V/D, palpitations,  worsening vaginal bleeding, or any other concerns.      Vital Signs Last 24 Hrs  T(C): 37 (29 Sep 2024 06:18), Max: 37.4 (28 Sep 2024 12:15)  T(F): 98.6 (29 Sep 2024 06:18), Max: 99.3 (28 Sep 2024 12:15)  HR: 72 (29 Sep 2024 06:18) (72 - 104)  BP: 120/76 (29 Sep 2024 06:18) (111/67 - 134/83)  BP(mean): --  RR: 16 (29 Sep 2024 06:18) (15 - 18)  SpO2: 98% (29 Sep 2024 06:18) (95% - 98%)    Parameters below as of 29 Sep 2024 06:18  Patient On (Oxygen Delivery Method): room air        Physical Exam:     Gen: A&Ox 3, NAD  Breast: Soft, nontender, nonengorged, no erythema  Abdomen: +BS, Soft, nontender, ND; Fundus firm  Gyn: mod lochia, repaired  Ext: Nontender,no worsening edema                          8.4    12.90 )-----------( 145      ( 29 Sep 2024 07:18 )             26.2     09-29    140  |  110[H]  |  7   ----------------------------<  57[L]  3.8   |  24  |  0.40[L]    Ca    9.6      29 Sep 2024 07:18    
Patient seen at bedside resting comfortably offers no current complaints.  Ambulating and voiding without difficulty.  Passing flatus and tolerating regular diet.  both breast/bottle feeding.  Denies HA, CP, SOB, N/V/D, dizziness, palpitations, worsening abdominal pain, worsening vaginal bleeding, or any other concerns.      Vital Signs Last 24 Hrs  T(C): 36.7 (30 Sep 2024 05:38), Max: 36.7 (29 Sep 2024 17:31)  T(F): 98.1 (30 Sep 2024 05:38), Max: 98.1 (30 Sep 2024 05:38)  HR: 66 (30 Sep 2024 05:38) (66 - 74)  BP: 122/68 (30 Sep 2024 05:38) (122/68 - 124/76)  BP(mean): --  RR: 16 (30 Sep 2024 05:38) (16 - 18)  SpO2: 99% (30 Sep 2024 05:38) (97% - 99%)    Parameters below as of 30 Sep 2024 05:38  Patient On (Oxygen Delivery Method): room air        Physical Exam:     Gen: A&Ox 3, NAD  Chest: CTA B/L  Cardiac: S1,S2; RRR  Breast: Soft, nontender, nonengorged  Abdomen: +BS; Soft, nontender, ND; Fundus firm below umbilicus  Gyn: Min lochia  Ext: Nontender, DTRS 2+, no worsening edema                          8.4    12.90 )-----------( 145      ( 29 Sep 2024 07:18 )             26.2        A/P:  PPD#2 s/p  with acute on chronic anemia asymptomatic    - Discharge home with instructions  -Follow up in office in 5-6 weeks for postpartum visit  -Breastfeeding encouraged   -d/w dr. laine brooks

## 2024-10-11 PROBLEM — H53.19 OTHER SUBJECTIVE VISUAL DISTURBANCES: Chronic | Status: ACTIVE | Noted: 2024-09-27

## 2024-10-17 ENCOUNTER — APPOINTMENT (OUTPATIENT)
Age: 34
End: 2024-10-17
Payer: MEDICAID

## 2024-10-17 ENCOUNTER — TRANSCRIPTION ENCOUNTER (OUTPATIENT)
Age: 34
End: 2024-10-17

## 2024-10-17 PROCEDURE — S9445: CPT | Mod: 95

## 2025-07-23 NOTE — OB RN PATIENT PROFILE - NSSDOHTRANSPORT_OBGYN_A_OB
Reason for call:   [x] Refill   [] Prior Auth  [x] Other: Patient would like refills. Patient is planing to stay on this dose    Office:   [x] PCP/Provider - : TRELL Bardales   [] Specialty/Provider -     Medication: tirzepatide (Zepbound) 2.5 mg/0.5 mL auto-injector     Dose/Frequency: Inject 0.5 mL (2.5 mg total) under the skin once a week for 28 days     Quantity: 2 ml    Pharmacy: [id number] [place] - [address] [place] [address] Pharmacy   Does the patient have enough for 3 days?   [] Yes   [x] No - Send as HP to POD    Mail Away Pharmacy   Does the patient have enough for 10 days?   [] Yes   [] No - Send as HP to POD    
no